# Patient Record
Sex: FEMALE | Race: WHITE
[De-identification: names, ages, dates, MRNs, and addresses within clinical notes are randomized per-mention and may not be internally consistent; named-entity substitution may affect disease eponyms.]

---

## 2020-11-26 ENCOUNTER — HOSPITAL ENCOUNTER (EMERGENCY)
Dept: HOSPITAL 96 - M.ERS | Age: 50
Discharge: HOME | End: 2020-11-26
Payer: COMMERCIAL

## 2020-11-26 VITALS — SYSTOLIC BLOOD PRESSURE: 94 MMHG | DIASTOLIC BLOOD PRESSURE: 55 MMHG

## 2020-11-26 VITALS — BODY MASS INDEX: 28.17 KG/M2 | HEIGHT: 64 IN | WEIGHT: 164.99 LBS

## 2020-11-26 DIAGNOSIS — R11.0: ICD-10-CM

## 2020-11-26 DIAGNOSIS — R07.9: ICD-10-CM

## 2020-11-26 DIAGNOSIS — U07.1: Primary | ICD-10-CM

## 2020-11-26 LAB
ABSOLUTE BASOPHILS: 0 THOU/UL (ref 0–0.2)
ABSOLUTE EOSINOPHILS: 0 THOU/UL (ref 0–0.7)
ABSOLUTE MONOCYTES: 0.1 THOU/UL (ref 0–1.2)
ALBUMIN SERPL-MCNC: 3.5 G/DL (ref 3.4–5)
ALP SERPL-CCNC: 76 U/L (ref 46–116)
ALT SERPL-CCNC: 38 U/L (ref 30–65)
ANION GAP SERPL CALC-SCNC: 4 MMOL/L (ref 7–16)
APTT BLD: 31.8 SECONDS (ref 25–31.3)
AST SERPL-CCNC: 46 U/L (ref 15–37)
BASOPHILS NFR BLD AUTO: 0.5 %
BILIRUB SERPL-MCNC: 0.1 MG/DL
BUN SERPL-MCNC: 6 MG/DL (ref 7–18)
CALCIUM SERPL-MCNC: 7.5 MG/DL (ref 8.5–10.1)
CHLORIDE SERPL-SCNC: 100 MMOL/L (ref 98–107)
CO2 SERPL-SCNC: 30 MMOL/L (ref 21–32)
CREAT SERPL-MCNC: 0.6 MG/DL (ref 0.6–1.3)
EOSINOPHIL NFR BLD: 0.1 %
GLUCOSE SERPL-MCNC: 117 MG/DL (ref 70–99)
GRANULOCYTES NFR BLD MANUAL: 45.1 %
HCT VFR BLD CALC: 34.1 % (ref 37–47)
HGB BLD-MCNC: 11.3 GM/DL (ref 12–15)
INR PPP: 0.9
LIPASE: 107 U/L (ref 73–393)
LYMPHOCYTES # BLD: 1 THOU/UL (ref 0.8–5.3)
LYMPHOCYTES NFR BLD AUTO: 49.4 %
MCH RBC QN AUTO: 27.9 PG (ref 26–34)
MCHC RBC AUTO-ENTMCNC: 33.1 G/DL (ref 28–37)
MCV RBC: 84.2 FL (ref 80–100)
MONOCYTES NFR BLD: 4.9 %
MPV: 7.7 FL. (ref 7.2–11.1)
NEUTROPHILS # BLD: 1 THOU/UL (ref 1.6–8.1)
NT-PRO BRAIN NAT PEPTIDE: 34 PG/ML (ref ?–300)
NUCLEATED RBCS: 0 /100WBC
PLATELET COUNT*: 139 THOU/UL (ref 150–400)
POTASSIUM SERPL-SCNC: 3.5 MMOL/L (ref 3.5–5.1)
PROT SERPL-MCNC: 6.6 G/DL (ref 6.4–8.2)
PROTHROMBIN TIME: 10 SECONDS (ref 9.2–11.5)
RBC # BLD AUTO: 4.06 MIL/UL (ref 4.2–5)
RDW-CV: 12.7 % (ref 10.5–14.5)
SODIUM SERPL-SCNC: 134 MMOL/L (ref 136–145)
WBC # BLD AUTO: 2.1 THOU/UL (ref 4–11)

## 2020-11-29 ENCOUNTER — HOSPITAL ENCOUNTER (INPATIENT)
Dept: HOSPITAL 96 - M.ERS | Age: 50
LOS: 3 days | Discharge: HOME | DRG: 177 | End: 2020-12-02
Attending: INTERNAL MEDICINE | Admitting: INTERNAL MEDICINE
Payer: COMMERCIAL

## 2020-11-29 VITALS — SYSTOLIC BLOOD PRESSURE: 120 MMHG | DIASTOLIC BLOOD PRESSURE: 69 MMHG

## 2020-11-29 VITALS — SYSTOLIC BLOOD PRESSURE: 86 MMHG | DIASTOLIC BLOOD PRESSURE: 51 MMHG

## 2020-11-29 VITALS — HEIGHT: 65 IN | WEIGHT: 177 LBS | BODY MASS INDEX: 29.49 KG/M2

## 2020-11-29 VITALS — DIASTOLIC BLOOD PRESSURE: 66 MMHG | SYSTOLIC BLOOD PRESSURE: 99 MMHG

## 2020-11-29 DIAGNOSIS — F41.9: ICD-10-CM

## 2020-11-29 DIAGNOSIS — Z79.899: ICD-10-CM

## 2020-11-29 DIAGNOSIS — R79.89: ICD-10-CM

## 2020-11-29 DIAGNOSIS — F19.10: ICD-10-CM

## 2020-11-29 DIAGNOSIS — J12.89: ICD-10-CM

## 2020-11-29 DIAGNOSIS — J96.00: ICD-10-CM

## 2020-11-29 DIAGNOSIS — U07.1: Primary | ICD-10-CM

## 2020-11-29 DIAGNOSIS — R65.10: ICD-10-CM

## 2020-11-29 DIAGNOSIS — F10.11: ICD-10-CM

## 2020-11-29 LAB
ABSOLUTE BASOPHILS: 0 THOU/UL (ref 0–0.2)
ABSOLUTE EOSINOPHILS: 0 THOU/UL (ref 0–0.7)
ABSOLUTE MONOCYTES: 0.4 THOU/UL (ref 0–1.2)
ALBUMIN SERPL-MCNC: 3.1 G/DL (ref 3.4–5)
ALP SERPL-CCNC: 123 U/L (ref 46–116)
ALT SERPL-CCNC: 67 U/L (ref 30–65)
ANION GAP SERPL CALC-SCNC: 2 MMOL/L (ref 7–16)
APTT BLD: 28.5 SECONDS (ref 25–31.3)
AST SERPL-CCNC: 72 U/L (ref 15–37)
BASOPHILS NFR BLD AUTO: 0.2 %
BILIRUB SERPL-MCNC: 0.3 MG/DL
BUN SERPL-MCNC: 6 MG/DL (ref 7–18)
CALCIUM SERPL-MCNC: 8.2 MG/DL (ref 8.5–10.1)
CHLORIDE SERPL-SCNC: 102 MMOL/L (ref 98–107)
CO2 SERPL-SCNC: 35 MMOL/L (ref 21–32)
CREAT SERPL-MCNC: 0.7 MG/DL (ref 0.6–1.3)
EOSINOPHIL NFR BLD: 0.3 %
GLUCOSE SERPL-MCNC: 97 MG/DL (ref 70–99)
GRANULOCYTES NFR BLD MANUAL: 57.8 %
HCT VFR BLD CALC: 32.8 % (ref 37–47)
HGB BLD-MCNC: 10.8 GM/DL (ref 12–15)
INR PPP: < 0.9
LYMPHOCYTES # BLD: 1.7 THOU/UL (ref 0.8–5.3)
LYMPHOCYTES NFR BLD AUTO: 33.4 %
MCH RBC QN AUTO: 28 PG (ref 26–34)
MCHC RBC AUTO-ENTMCNC: 33 G/DL (ref 28–37)
MCV RBC: 84.8 FL (ref 80–100)
MONOCYTES NFR BLD: 8.3 %
MPV: 7.4 FL. (ref 7.2–11.1)
NEUTROPHILS # BLD: 2.9 THOU/UL (ref 1.6–8.1)
NT-PRO BRAIN NAT PEPTIDE: 230 PG/ML (ref ?–300)
NUCLEATED RBCS: 0 /100WBC
PLATELET COUNT*: 235 THOU/UL (ref 150–400)
POTASSIUM SERPL-SCNC: 3.4 MMOL/L (ref 3.5–5.1)
PROT SERPL-MCNC: 6.3 G/DL (ref 6.4–8.2)
PROTHROMBIN TIME: 9.7 SECONDS (ref 9.2–11.5)
RBC # BLD AUTO: 3.87 MIL/UL (ref 4.2–5)
RDW-CV: 12.6 % (ref 10.5–14.5)
SODIUM SERPL-SCNC: 139 MMOL/L (ref 136–145)
WBC # BLD AUTO: 5.1 THOU/UL (ref 4–11)

## 2020-11-30 VITALS — SYSTOLIC BLOOD PRESSURE: 112 MMHG | DIASTOLIC BLOOD PRESSURE: 68 MMHG

## 2020-11-30 VITALS — SYSTOLIC BLOOD PRESSURE: 112 MMHG | DIASTOLIC BLOOD PRESSURE: 71 MMHG

## 2020-11-30 VITALS — DIASTOLIC BLOOD PRESSURE: 63 MMHG | SYSTOLIC BLOOD PRESSURE: 116 MMHG

## 2020-11-30 VITALS — DIASTOLIC BLOOD PRESSURE: 76 MMHG | SYSTOLIC BLOOD PRESSURE: 129 MMHG

## 2020-11-30 VITALS — SYSTOLIC BLOOD PRESSURE: 117 MMHG | DIASTOLIC BLOOD PRESSURE: 75 MMHG

## 2020-11-30 VITALS — DIASTOLIC BLOOD PRESSURE: 70 MMHG | SYSTOLIC BLOOD PRESSURE: 108 MMHG

## 2020-11-30 VITALS — SYSTOLIC BLOOD PRESSURE: 120 MMHG | DIASTOLIC BLOOD PRESSURE: 73 MMHG

## 2020-11-30 LAB
ABSOLUTE BASOPHILS: 0 THOU/UL (ref 0–0.2)
ABSOLUTE EOSINOPHILS: 0 THOU/UL (ref 0–0.7)
ABSOLUTE MONOCYTES: 0.1 THOU/UL (ref 0–1.2)
ALBUMIN SERPL-MCNC: 3.2 G/DL (ref 3.4–5)
ALP SERPL-CCNC: 137 U/L (ref 46–116)
ALT SERPL-CCNC: 63 U/L (ref 30–65)
ANION GAP SERPL CALC-SCNC: 6 MMOL/L (ref 7–16)
AST SERPL-CCNC: 51 U/L (ref 15–37)
BASOPHILS NFR BLD AUTO: 0.1 %
BILIRUB SERPL-MCNC: 0.3 MG/DL
BUN SERPL-MCNC: 5 MG/DL (ref 7–18)
CALCIUM SERPL-MCNC: 8.3 MG/DL (ref 8.5–10.1)
CHLORIDE SERPL-SCNC: 99 MMOL/L (ref 98–107)
CO2 SERPL-SCNC: 34 MMOL/L (ref 21–32)
CREAT SERPL-MCNC: 0.6 MG/DL (ref 0.6–1.3)
EOSINOPHIL NFR BLD: 0 %
GLUCOSE SERPL-MCNC: 189 MG/DL (ref 70–99)
GRANULOCYTES NFR BLD MANUAL: 80 %
HCT VFR BLD CALC: 34.9 % (ref 37–47)
HGB BLD-MCNC: 11.6 GM/DL (ref 12–15)
LYMPHOCYTES # BLD: 0.6 THOU/UL (ref 0.8–5.3)
LYMPHOCYTES NFR BLD AUTO: 17.3 %
MCH RBC QN AUTO: 27.9 PG (ref 26–34)
MCHC RBC AUTO-ENTMCNC: 33.2 G/DL (ref 28–37)
MCV RBC: 84.1 FL (ref 80–100)
MONOCYTES NFR BLD: 2.6 %
MPV: 7.3 FL. (ref 7.2–11.1)
NEUTROPHILS # BLD: 2.8 THOU/UL (ref 1.6–8.1)
NUCLEATED RBCS: 0 /100WBC
PLATELET COUNT*: 266 THOU/UL (ref 150–400)
POTASSIUM SERPL-SCNC: 3.8 MMOL/L (ref 3.5–5.1)
PROT SERPL-MCNC: 6.8 G/DL (ref 6.4–8.2)
RBC # BLD AUTO: 4.15 MIL/UL (ref 4.2–5)
RDW-CV: 12.4 % (ref 10.5–14.5)
SODIUM SERPL-SCNC: 139 MMOL/L (ref 136–145)
TROPONIN-I LEVEL: <0.06 NG/ML (ref ?–0.06)
WBC # BLD AUTO: 3.5 THOU/UL (ref 4–11)

## 2020-11-30 PROCEDURE — XW13325 TRANSFUSION OF CONVALESCENT PLASMA (NONAUTOLOGOUS) INTO PERIPHERAL VEIN, PERCUTANEOUS APPROACH, NEW TECHNOLOGY GROUP 5: ICD-10-PCS | Performed by: INTERNAL MEDICINE

## 2020-11-30 NOTE — EKG
[place] [address]
Phone:  [phone]                     ELECTROCARDIOGRAM REPORT      
_______________________________________________________________________________
 
Name:         LEEANN HARMON                Room:          [address]    ADM IN 
.R.#:    [id number]     Account #:     [id number]  
Admission:    20    Attend Phys:   Marni Jo, 
Discharge:                Date of Birth: [birth year]  
Date of Service: 20 1719  Report #:      [id number]
        [id number]
_______________________________________________________________________________
THIS REPORT FOR:  //name//                      
 
                         [facility] ED
                                       
Test Date:    2020               Test Time:    17:19:16
Pat Name:     LEEANN HARMON             Department:   
Patient ID:   [id number]            Room:         [facility]
Gender:       F                        Technician:   
:          1970               Requested By: Minh Garcia
Order Number: [id number] MD:   Chaim Knight
                                 Measurements
Intervals                              Axis          
Rate:         57                       P:            46
AK:           162                      QRS:          46
QRSD:         100                      T:            21
QT:           439                                    
QTc:          428                                    
                           Interpretive Statements
Sinus bradycardia
Low voltage, precordial leads
No previous ECG available for comparison
Electronically Signed On 2020 9:00:00 CST by Chaim Knight
[email or web address]
 
 
 
 
 
 
 
 
 
 
 
 
 
 
 
 
 
 
 
 
 
  <ELECTRONICALLY SIGNED>
                                           By: Chaim Knight MD, [facility]      
  2000
D: 20   _____________________________________
T: 20   Chaim Knight MD, [facility]        /EPI

## 2020-11-30 NOTE — NUR
A&OX 4, PWD. LUNGS CLEAR UPPER LOBES AND RALES NOTED IN RIGHT LOWER LOBES.
LEFT UPPER AND LOWER LOBES CLEAR. HEART TONES REGULAR. MONITOR SHOWS SR/SB. ON
02 AT 1L PER NC. 02 SAT 95-97%. +BS X 4 QUADS, PEDAL PULSES PRESENT NO EDEMA
NOTED. HAD BM THIS AM PER PT. SL LEFT HAND AND LEFT AC. PT WAS GIVEN FFP'S
THIS AM.  PT HAS BEEN VERY ANXIOUS THIS SHIFT AND MEDICATION OBTAINED AND
GIVEN FOR ANXIETY. PT WAS ABLE TO SLEEP THIS AFTERNOON.  AND TWIN
SISTER CALLED TO CHECK UP ON PT THROUGHTOUT DAY. NO C/O AT THIS TIME. WILL
CONTINUE TO MONITOR.

## 2020-11-30 NOTE — NUR
SPOKE WITH PT.ON PHONE IN ROOM, DUE TO POSITJEFF COVID 19. LIVES WITH HER
, WHO IS SUPPORTIVE. HER CHILDREN ARE ALSO SUPPORTIVE. SHE SAID SHE IS
INDEPENDENT AND WORKS FULL TIME. NO DME OR HX OF HH. SHE IS ON 1-2L OF O2/NC.
PULMONARY CONSULT ORDERED. CM WILL FOLLOW.

## 2020-12-01 VITALS — DIASTOLIC BLOOD PRESSURE: 70 MMHG | SYSTOLIC BLOOD PRESSURE: 110 MMHG

## 2020-12-01 VITALS — DIASTOLIC BLOOD PRESSURE: 57 MMHG | SYSTOLIC BLOOD PRESSURE: 97 MMHG

## 2020-12-01 VITALS — DIASTOLIC BLOOD PRESSURE: 59 MMHG | SYSTOLIC BLOOD PRESSURE: 93 MMHG

## 2020-12-01 VITALS — SYSTOLIC BLOOD PRESSURE: 101 MMHG | DIASTOLIC BLOOD PRESSURE: 50 MMHG

## 2020-12-01 VITALS — SYSTOLIC BLOOD PRESSURE: 107 MMHG | DIASTOLIC BLOOD PRESSURE: 70 MMHG

## 2020-12-01 LAB
ABSOLUTE BASOPHILS: 0 THOU/UL (ref 0–0.2)
ABSOLUTE EOSINOPHILS: 0 THOU/UL (ref 0–0.7)
ABSOLUTE MONOCYTES: 0.4 THOU/UL (ref 0–1.2)
ALBUMIN SERPL-MCNC: 3.3 G/DL (ref 3.4–5)
ALP SERPL-CCNC: 119 U/L (ref 46–116)
ALT SERPL-CCNC: 54 U/L (ref 30–65)
ANION GAP SERPL CALC-SCNC: 6 MMOL/L (ref 7–16)
AST SERPL-CCNC: 36 U/L (ref 15–37)
BASOPHILS NFR BLD AUTO: 0.1 %
BILIRUB SERPL-MCNC: 0.3 MG/DL
BUN SERPL-MCNC: 9 MG/DL (ref 7–18)
CALCIUM SERPL-MCNC: 8.2 MG/DL (ref 8.5–10.1)
CHLORIDE SERPL-SCNC: 101 MMOL/L (ref 98–107)
CO2 SERPL-SCNC: 32 MMOL/L (ref 21–32)
CREAT SERPL-MCNC: 0.6 MG/DL (ref 0.6–1.3)
EOSINOPHIL NFR BLD: 0 %
GLUCOSE SERPL-MCNC: 147 MG/DL (ref 70–99)
GRANULOCYTES NFR BLD MANUAL: 79.6 %
HCT VFR BLD CALC: 33.4 % (ref 37–47)
HGB BLD-MCNC: 11.3 GM/DL (ref 12–15)
LYMPHOCYTES # BLD: 0.9 THOU/UL (ref 0.8–5.3)
LYMPHOCYTES NFR BLD AUTO: 13.9 %
MAGNESIUM SERPL-MCNC: 2.5 MG/DL (ref 1.8–2.4)
MCH RBC QN AUTO: 28.3 PG (ref 26–34)
MCHC RBC AUTO-ENTMCNC: 33.9 G/DL (ref 28–37)
MCV RBC: 83.5 FL (ref 80–100)
MONOCYTES NFR BLD: 6.4 %
MPV: 7.3 FL. (ref 7.2–11.1)
NEUTROPHILS # BLD: 5.3 THOU/UL (ref 1.6–8.1)
NUCLEATED RBCS: 0 /100WBC
PLATELET COUNT*: 336 THOU/UL (ref 150–400)
POTASSIUM SERPL-SCNC: 3.6 MMOL/L (ref 3.5–5.1)
PROT SERPL-MCNC: 7.1 G/DL (ref 6.4–8.2)
RBC # BLD AUTO: 3.99 MIL/UL (ref 4.2–5)
RDW-CV: 12.5 % (ref 10.5–14.5)
SODIUM SERPL-SCNC: 139 MMOL/L (ref 136–145)
WBC # BLD AUTO: 6.7 THOU/UL (ref 4–11)

## 2020-12-01 NOTE — NUR
STABLE TODAY. ON ROOM AIR. DISCUSSED AT [place], PT.REMAINS ON IV MEDS. NO
O2. CXR NO ACUTE PROCESS. ANTICIPATE DISCHARGE IN 1-2 DAYS.

## 2020-12-02 VITALS — DIASTOLIC BLOOD PRESSURE: 61 MMHG | SYSTOLIC BLOOD PRESSURE: 105 MMHG

## 2020-12-02 VITALS — SYSTOLIC BLOOD PRESSURE: 101 MMHG | DIASTOLIC BLOOD PRESSURE: 61 MMHG

## 2020-12-02 VITALS — SYSTOLIC BLOOD PRESSURE: 105 MMHG | DIASTOLIC BLOOD PRESSURE: 61 MMHG

## 2020-12-02 VITALS — DIASTOLIC BLOOD PRESSURE: 73 MMHG | SYSTOLIC BLOOD PRESSURE: 125 MMHG

## 2020-12-02 VITALS — SYSTOLIC BLOOD PRESSURE: 106 MMHG | DIASTOLIC BLOOD PRESSURE: 72 MMHG

## 2020-12-02 NOTE — NUR
PT IS ABLE TO COMMUNICATE HER NEEDS TO STAFF EFFECTIVELY. SHE HAS DENIED THE
NEED FOR PAIN MEDICATION UP TO THIS TIME. POSSIBLE DISCHARGE TODAY. SHE
REMAINS UP AD CLARITA AND ON ROOM AIR AT THIS TIME.

## 2020-12-02 NOTE — NUR
PT A&OX4 VSS. PT REMAINS +COVID. PT ON RROM AIR, SAT 94% AND GREATER. PT
SINUS/SHAVONNE ON MONITOR. IV TO LAC PATENT, DC'D PRIOR TO PT LEAVING UNIT. NO
REDNESS/SWELLING/BLEEDING OBSERVED AT SITE. PT UP AD CLARITA, GAIT STEADY. PT
STATES UNDERSTANDING OF DC INSTRUCITONS AND ISOLATION EDUCATION DISCUSSED. PT
LEAVES WITH PRINTED MATERIALS AND RX AS WELL. PT DRESSED INDEPENDENTLY, NO
DIFFICULTY. PT LEFT UNIT IN WC TRANSPORTED BY NURSING STAFF.

## 2020-12-04 NOTE — CON
[address]  [address]                    CONSULTATION                  
_______________________________________________________________________________
 
Name:       LEEANN HARMON                 Room:           [address] [place]  
..#:  [id number]      Account #:      [id number]  
Admission:  11/29/20     Attend Phys:    Marni Jo MD 
Discharge:  12/02/20     Date of Birth:  [birth year]  
         Report #: [id number]
                                                                     [id number]  
_______________________________________________________________________________
THIS REPORT FOR:  //name//                      
 
cc:  Keny Sanchez MD, Matthew W. MD                                                   ~
 
 
DATE OF SERVICE:  11/30/2020
 
 
REQUESTING PHYSICIAN:  Charlie Perrin MD
 
INDICATION FOR CONSULTATION:  COVID-19.
 
HISTORY OF PRESENT ILLNESS:  A 50-year-old female with past medical history is
as mentioned below.  The patient in fact does not take any medications long term
at home regularly.  The patient is now admitted with COVID-19.  She reports
having had chills, palpitations, anxiety as well as cough for the last few days,
also had body aches.  The patient has required up to 2 liters of oxygen to
maintain O2 saturation not below 90s earlier.  She was saturating 97% on 1 liter
at the time of my evaluation.
 
REVIEW OF SYSTEMS:  For 10 points is negative except as mentioned above.
 
PAST MEDICAL HISTORY:  No major long-term diseases.  Does have a body mass index
elevated to 30.
 
SOCIAL HISTORY:  No known history of smoking, ethanol abuse or drug abuse.
 
ALLERGIES:  No known drug allergies.
 
[facility] MEDICATIONS:  [facility]
 
HOME MEDICATIONS:  Which are not current, past use in [facility] reviewed.
 
FAMILY HISTORY:  There is no pertinent family history.
 
PHYSICAL EXAMINATION:
GENERAL:  She appeared to be anxious, but alert, awake and oriented.
VITAL SIGNS:  In the records reviewed.
NECK:  Does not show raised JVP.
CHEST:  Clear to auscultation.
HEART:  Regular.  No murmur.
ABDOMEN:  Soft and nontender.
LOWER EXTREMITIES:  Show no edema, no calf tenderness.
 
LABORATORY DATA:  The patient's CTA chest is reviewed and it shows infiltrates
 
 
 
[place] [address]                    CONSULTATION                  
_______________________________________________________________________________
 
Name:       LEEANN HARMON                 Room:           [address]#:  [id number]      Account #:      [id number]  
Admission:  11/29/20     Attend Phys:    Marni Jo MD 
Discharge:  12/02/20     Date of Birth:  [birth year]  
         Report #: [id number]
                                                                     [id number]  
_______________________________________________________________________________
 
 
consistent with COVID-19.  The patient's lab work is in [facility].  This was
reviewed.  Elevation in LFTs is noted.
 
ASSESSMENT/PLAN:
1.  COVID-19.  I agree with steroids as well as convalescent plasma as already
ordered.  I adjusted the steroid dose.  I recommend watching the patient very
closely should there be any deterioration.  I recommend having a very low
threshold of starting remdesivir.  I recommend watching LFTs closely.
2.  Pulmonary infiltrates.  She may have secondary bacterial infection as well. 
I agree with antibiotics as currently ordered.
3.  Deep venous thrombosis prophylaxis, Lovenox.
4.  Clostridium difficile prophylaxis, Florastor.
 
Thanks for this consultation.
 
 
 
 
 
 
 
 
 
 
 
 
 
 
 
 
 
 
 
 
 
 
 
 
 
 
 
 
<ELECTRONICALLY SIGNED>
                                        By:  Anirudh Segundo MD              
12/04/20     1443
D: 11/30/20 1938_______________________________________
T: 11/30/20 2025Asarah Segundo MD                 /nt